# Patient Record
(demographics unavailable — no encounter records)

---

## 2025-05-01 NOTE — HISTORY OF PRESENT ILLNESS
[FreeTextEntry1] : Patient has a history of low volume Hector 6 prostate cancer, diagnosed June 2018. MRI prostate Nov 2018: PIRADS 2 PV 70ml  MRI done at [facility] 3/22/2021 demonstrated a PIRADS 5 lesion, PV 73 ml  Underwent Prostate fusion biopsy 4/28/2021.  Path: No tumor seen.  MRI prostate ([facility]) 9/2/2021: 91 mL volume, PIRAD 2.  PSA  8/3/2021 15.5 ng/mL, 18% free. 11/4/2021 8.07 ng/mL, 14% free 4/5/2022 4.22 ng/mL, 27% free 6/20/2023 3.5 ng/mL< 31% free 3/27/2024 4.03 ng/mL, 24% free  Has been on dutasteride 0.5 mg since 8/3/2021. Previously on oxybutynin ER but discontinue due to lack of efficacy. Urinary function remains stable.  No UTI, hematuria since the last visit.

## 2025-05-01 NOTE — ASSESSMENT
[FreeTextEntry1] : Continue current medications, rx renewed. PSA f/t today.  Will call with results.

## 2025-05-01 NOTE — HISTORY OF PRESENT ILLNESS
[FreeTextEntry1] : Patient has a history of low volume Hector 6 prostate cancer, diagnosed June 2018. MRI prostate Nov 2018: PIRADS 2 PV 70ml  MRI done at [facility] 3/22/2021 demonstrated a PIRADS 5 lesion, PV 73 ml  Underwent Prostate fusion biopsy 4/28/2021.  Path: No tumor seen.  MRI prostate ([facility]) 9/2/2021: 91 mL volume, PIRAD 2.  PSA  8/3/2021 15.5 ng/mL, 18% free. 11/4/2021 8.07 ng/mL, 14% free 4/5/2022 4.22 ng/mL, 27% free 6/20/2023 3.5 ng/mL< 31% free 3/27/2024 4.03 ng/mL, 24% free  Has been on dutasteride 0.5 mg since 8/3/2021. Previously on oxybutynin ER but discontinue due to lack of efficacy. Urinary function remains stable.  No UTI, hematuria since the last visit.